# Patient Record
Sex: FEMALE | Race: WHITE | NOT HISPANIC OR LATINO | Employment: PART TIME | URBAN - METROPOLITAN AREA
[De-identification: names, ages, dates, MRNs, and addresses within clinical notes are randomized per-mention and may not be internally consistent; named-entity substitution may affect disease eponyms.]

---

## 2019-03-21 ENCOUNTER — OFFICE VISIT (OUTPATIENT)
Dept: INTERNAL MEDICINE | Age: 54
End: 2019-03-21

## 2019-03-21 ENCOUNTER — IMAGING SERVICES (OUTPATIENT)
Dept: GENERAL RADIOLOGY | Age: 54
End: 2019-03-21

## 2019-03-21 VITALS
DIASTOLIC BLOOD PRESSURE: 90 MMHG | TEMPERATURE: 98.4 F | SYSTOLIC BLOOD PRESSURE: 160 MMHG | WEIGHT: 207.23 LBS | HEART RATE: 88 BPM | OXYGEN SATURATION: 96 %

## 2019-03-21 DIAGNOSIS — Z80.0 FAMILY HX OF COLON CANCER REQUIRING SCREENING COLONOSCOPY: ICD-10-CM

## 2019-03-21 DIAGNOSIS — M25.561 RIGHT KNEE PAIN, UNSPECIFIED CHRONICITY: ICD-10-CM

## 2019-03-21 DIAGNOSIS — I10 ESSENTIAL HYPERTENSION: Primary | ICD-10-CM

## 2019-03-21 DIAGNOSIS — Z12.39 BREAST SCREENING: ICD-10-CM

## 2019-03-21 PROCEDURE — 73562 X-RAY EXAM OF KNEE 3: CPT | Performed by: RADIOLOGY

## 2019-03-21 PROCEDURE — 99204 OFFICE O/P NEW MOD 45 MIN: CPT | Performed by: INTERNAL MEDICINE

## 2019-03-21 RX ORDER — LISINOPRIL 20 MG/1
20 TABLET ORAL DAILY
Qty: 90 TABLET | Refills: 1 | Status: SHIPPED | OUTPATIENT
Start: 2019-03-21

## 2019-03-21 SDOH — HEALTH STABILITY: MENTAL HEALTH: HOW OFTEN DO YOU HAVE A DRINK CONTAINING ALCOHOL?: NEVER

## 2019-03-21 ASSESSMENT — ENCOUNTER SYMPTOMS
PSYCHIATRIC NEGATIVE: 1
EYES NEGATIVE: 1
ALLERGIC/IMMUNOLOGIC NEGATIVE: 1
DIZZINESS: 1
ENDOCRINE NEGATIVE: 1
RESPIRATORY NEGATIVE: 1
GASTROINTESTINAL NEGATIVE: 1
ACTIVITY CHANGE: 0
HEMATOLOGIC/LYMPHATIC NEGATIVE: 1

## 2019-03-21 ASSESSMENT — PATIENT HEALTH QUESTIONNAIRE - PHQ9
SUM OF ALL RESPONSES TO PHQ9 QUESTIONS 1 AND 2: 2
SUM OF ALL RESPONSES TO PHQ9 QUESTIONS 1 AND 2: 2
1. LITTLE INTEREST OR PLEASURE IN DOING THINGS: SEVERAL DAYS
2. FEELING DOWN, DEPRESSED OR HOPELESS: SEVERAL DAYS

## 2019-03-22 ENCOUNTER — TELEPHONE (OUTPATIENT)
Dept: FAMILY MEDICINE | Age: 54
End: 2019-03-22

## 2019-03-25 ENCOUNTER — LAB SERVICES (OUTPATIENT)
Dept: LAB | Age: 54
End: 2019-03-25

## 2019-03-25 ENCOUNTER — TELEPHONE (OUTPATIENT)
Dept: ADMISSION | Age: 54
End: 2019-03-25

## 2019-03-25 DIAGNOSIS — I10 ESSENTIAL HYPERTENSION: ICD-10-CM

## 2019-03-25 LAB
ALBUMIN SERPL-MCNC: 3.9 G/DL (ref 3.6–5.1)
ALBUMIN/GLOB SERPL: 1.3 {RATIO} (ref 1–2.4)
ALP SERPL-CCNC: 88 UNITS/L (ref 45–117)
ALT SERPL-CCNC: 20 UNITS/L
ANION GAP SERPL CALC-SCNC: 11 MMOL/L (ref 10–20)
AST SERPL-CCNC: 11 UNITS/L
BASOPHILS # BLD AUTO: 0 K/MCL (ref 0–0.3)
BASOPHILS NFR BLD AUTO: 0 %
BILIRUB SERPL-MCNC: 0.5 MG/DL (ref 0.2–1)
BUN SERPL-MCNC: 14 MG/DL (ref 6–20)
BUN/CREAT SERPL: 20 (ref 7–25)
CALCIUM SERPL-MCNC: 9 MG/DL (ref 8.4–10.2)
CHLORIDE SERPL-SCNC: 112 MMOL/L (ref 98–107)
CHOLEST SERPL-MCNC: 235 MG/DL
CHOLEST/HDLC SERPL: 6.5 {RATIO}
CO2 SERPL-SCNC: 26 MMOL/L (ref 21–32)
CREAT SERPL-MCNC: 0.7 MG/DL (ref 0.51–0.95)
DIFFERENTIAL METHOD BLD: ABNORMAL
EOSINOPHIL # BLD AUTO: 0.2 K/MCL (ref 0.1–0.5)
EOSINOPHIL NFR SPEC: 3 %
ERYTHROCYTE [DISTWIDTH] IN BLOOD: 14.8 % (ref 11–15)
FASTING STATUS PATIENT QL REPORTED: 12 HRS
GLOBULIN SER-MCNC: 3 G/DL (ref 2–4)
GLUCOSE SERPL-MCNC: 91 MG/DL (ref 65–99)
HCT VFR BLD CALC: 47.1 % (ref 36–46.5)
HDLC SERPL-MCNC: 36 MG/DL
HGB BLD-MCNC: 14.9 G/DL (ref 12–15.5)
LDLC SERPL-MCNC: 119 MG/DL
LENGTH OF FAST TIME PATIENT: 12 HRS
LYMPHOCYTES # BLD MANUAL: 2.1 K/MCL (ref 1–4)
LYMPHOCYTES NFR BLD MANUAL: 28 %
MCH RBC QN AUTO: 31 PG (ref 26–34)
MCHC RBC AUTO-ENTMCNC: 31.6 G/DL (ref 32–36.5)
MCV RBC AUTO: 97.9 FL (ref 78–100)
MONOCYTES # BLD MANUAL: 0.6 K/MCL (ref 0.3–0.9)
MONOCYTES NFR BLD MANUAL: 8 %
NEUTROPHILS # BLD: 4.4 K/MCL (ref 1.8–7.7)
NEUTROPHILS NFR BLD AUTO: 61 %
NONHDLC SERPL-MCNC: 199 MG/DL
PLATELET # BLD: 156 K/MCL (ref 140–450)
POTASSIUM SERPL-SCNC: 4.1 MMOL/L (ref 3.4–5.1)
PROT SERPL-MCNC: 6.9 G/DL (ref 6.4–8.2)
RBC # BLD: 4.81 MIL/MCL (ref 4–5.2)
SODIUM SERPL-SCNC: 145 MMOL/L (ref 135–145)
TRIGL SERPL-MCNC: 400 MG/DL
WBC # BLD: 7.3 K/MCL (ref 4.2–11)

## 2019-03-25 PROCEDURE — 80053 COMPREHEN METABOLIC PANEL: CPT | Performed by: INTERNAL MEDICINE

## 2019-03-25 PROCEDURE — 36415 COLL VENOUS BLD VENIPUNCTURE: CPT | Performed by: INTERNAL MEDICINE

## 2019-03-25 PROCEDURE — 85025 COMPLETE CBC W/AUTO DIFF WBC: CPT | Performed by: INTERNAL MEDICINE

## 2019-03-25 PROCEDURE — 80061 LIPID PANEL: CPT | Performed by: INTERNAL MEDICINE

## 2019-03-27 DIAGNOSIS — E78.1 HYPERTRIGLYCERIDEMIA: Primary | ICD-10-CM

## 2019-03-28 ENCOUNTER — TELEPHONE (OUTPATIENT)
Dept: FAMILY MEDICINE | Age: 54
End: 2019-03-28

## 2019-03-29 ENCOUNTER — WALK IN (OUTPATIENT)
Dept: URGENT CARE | Age: 54
End: 2019-03-29

## 2019-03-29 DIAGNOSIS — J01.90 ACUTE NON-RECURRENT SINUSITIS, UNSPECIFIED LOCATION: Primary | ICD-10-CM

## 2019-03-29 DIAGNOSIS — H61.23 BILATERAL IMPACTED CERUMEN: ICD-10-CM

## 2019-03-29 PROCEDURE — 99202 OFFICE O/P NEW SF 15 MIN: CPT | Performed by: EMERGENCY MEDICINE

## 2019-03-29 PROCEDURE — 69210 REMOVE IMPACTED EAR WAX UNI: CPT | Performed by: EMERGENCY MEDICINE

## 2019-03-29 RX ORDER — AMOXICILLIN AND CLAVULANATE POTASSIUM 875; 125 MG/1; MG/1
1 TABLET, FILM COATED ORAL EVERY 12 HOURS
Qty: 14 TABLET | Refills: 0 | Status: SHIPPED | OUTPATIENT
Start: 2019-03-29 | End: 2019-04-05

## 2019-03-29 ASSESSMENT — ENCOUNTER SYMPTOMS
VOMITING: 0
SORE THROAT: 0
NAUSEA: 0
ABDOMINAL PAIN: 0
SHORTNESS OF BREATH: 0
EYE PAIN: 0
EYE REDNESS: 0
HEADACHES: 1
CHILLS: 0
DIARRHEA: 0
COUGH: 1
DECREASED APPETITE: 0
FEVER: 1

## 2019-03-29 ASSESSMENT — PAIN SCALES - GENERAL: PAINLEVEL: 0

## 2019-03-31 ENCOUNTER — TELEPHONE (OUTPATIENT)
Dept: URGENT CARE | Age: 54
End: 2019-03-31

## 2019-04-11 ENCOUNTER — APPOINTMENT (OUTPATIENT)
Dept: ORTHOPEDICS | Age: 54
End: 2019-04-11
Attending: INTERNAL MEDICINE

## 2019-04-24 ENCOUNTER — IMAGING SERVICES (OUTPATIENT)
Dept: MAMMOGRAPHY | Age: 54
End: 2019-04-24
Attending: INTERNAL MEDICINE

## 2019-04-24 DIAGNOSIS — Z12.39 BREAST SCREENING: ICD-10-CM

## 2019-04-24 DIAGNOSIS — I10 ESSENTIAL HYPERTENSION: ICD-10-CM

## 2019-04-24 PROCEDURE — 77067 SCR MAMMO BI INCL CAD: CPT | Performed by: RADIOLOGY

## 2019-04-24 PROCEDURE — 77063 BREAST TOMOSYNTHESIS BI: CPT | Performed by: RADIOLOGY

## 2019-05-02 ENCOUNTER — APPOINTMENT (OUTPATIENT)
Dept: ORTHOPEDICS | Age: 54
End: 2019-05-02
Attending: INTERNAL MEDICINE

## 2019-05-16 ENCOUNTER — APPOINTMENT (OUTPATIENT)
Dept: INTERNAL MEDICINE | Age: 54
End: 2019-05-16

## 2019-05-24 ENCOUNTER — OFFICE VISIT (OUTPATIENT)
Dept: ORTHOPEDICS | Age: 54
End: 2019-05-24

## 2019-05-24 VITALS — HEIGHT: 62 IN | WEIGHT: 205.03 LBS | BODY MASS INDEX: 37.73 KG/M2

## 2019-05-24 DIAGNOSIS — G89.29 CHRONIC PAIN OF RIGHT KNEE: Primary | ICD-10-CM

## 2019-05-24 DIAGNOSIS — M25.561 CHRONIC PAIN OF RIGHT KNEE: Primary | ICD-10-CM

## 2019-05-24 PROCEDURE — 99203 OFFICE O/P NEW LOW 30 MIN: CPT | Performed by: ORTHOPAEDIC SURGERY

## 2019-07-29 ENCOUNTER — WALK IN (OUTPATIENT)
Dept: URGENT CARE | Age: 54
End: 2019-07-29

## 2019-07-29 VITALS
HEIGHT: 63 IN | DIASTOLIC BLOOD PRESSURE: 88 MMHG | HEART RATE: 81 BPM | OXYGEN SATURATION: 98 % | SYSTOLIC BLOOD PRESSURE: 136 MMHG | WEIGHT: 180 LBS | TEMPERATURE: 98.3 F | BODY MASS INDEX: 31.89 KG/M2

## 2019-07-29 DIAGNOSIS — H92.01 RIGHT EAR PAIN: Primary | ICD-10-CM

## 2019-07-29 DIAGNOSIS — H61.21 IMPACTED CERUMEN OF RIGHT EAR: ICD-10-CM

## 2019-07-29 PROCEDURE — 69209 REMOVE IMPACTED EAR WAX UNI: CPT | Performed by: FAMILY MEDICINE

## 2019-07-29 PROCEDURE — 99213 OFFICE O/P EST LOW 20 MIN: CPT | Performed by: FAMILY MEDICINE

## 2019-07-29 SDOH — HEALTH STABILITY: MENTAL HEALTH: HOW OFTEN DO YOU HAVE A DRINK CONTAINING ALCOHOL?: NEVER

## 2019-08-15 ENCOUNTER — TELEPHONE (OUTPATIENT)
Dept: SCHEDULING | Age: 54
End: 2019-08-15

## 2019-08-21 ENCOUNTER — OFFICE VISIT (OUTPATIENT)
Dept: OTOLARYNGOLOGY | Age: 54
End: 2019-08-21
Attending: FAMILY MEDICINE

## 2019-08-21 VITALS
HEART RATE: 96 BPM | BODY MASS INDEX: 36.14 KG/M2 | WEIGHT: 204 LBS | SYSTOLIC BLOOD PRESSURE: 128 MMHG | OXYGEN SATURATION: 95 % | DIASTOLIC BLOOD PRESSURE: 80 MMHG

## 2019-08-21 DIAGNOSIS — H61.23 BILATERAL IMPACTED CERUMEN: Primary | ICD-10-CM

## 2019-08-21 PROCEDURE — 69210 REMOVE IMPACTED EAR WAX UNI: CPT | Performed by: OTOLARYNGOLOGY

## 2019-08-22 ENCOUNTER — APPOINTMENT (OUTPATIENT)
Dept: INTERNAL MEDICINE | Age: 54
End: 2019-08-22

## 2019-08-26 ENCOUNTER — TELEPHONE (OUTPATIENT)
Dept: OTOLARYNGOLOGY | Age: 54
End: 2019-08-26

## 2019-09-12 ENCOUNTER — TELEPHONE (OUTPATIENT)
Dept: OTOLARYNGOLOGY | Age: 54
End: 2019-09-12

## 2021-05-26 VITALS
HEART RATE: 106 BPM | HEIGHT: 62 IN | WEIGHT: 205 LBS | OXYGEN SATURATION: 99 % | RESPIRATION RATE: 22 BRPM | BODY MASS INDEX: 37.73 KG/M2 | TEMPERATURE: 99.8 F | SYSTOLIC BLOOD PRESSURE: 146 MMHG | DIASTOLIC BLOOD PRESSURE: 86 MMHG

## 2024-02-05 ENCOUNTER — OFFICE VISIT (OUTPATIENT)
Dept: INTERNAL MEDICINE | Facility: CLINIC | Age: 59
End: 2024-02-05
Payer: COMMERCIAL

## 2024-02-05 VITALS
HEART RATE: 91 BPM | BODY MASS INDEX: 34.73 KG/M2 | WEIGHT: 196 LBS | OXYGEN SATURATION: 91 % | DIASTOLIC BLOOD PRESSURE: 96 MMHG | HEIGHT: 63 IN | SYSTOLIC BLOOD PRESSURE: 148 MMHG

## 2024-02-05 DIAGNOSIS — E78.2 MIXED HYPERLIPIDEMIA: ICD-10-CM

## 2024-02-05 DIAGNOSIS — Z12.31 SCREENING MAMMOGRAM, ENCOUNTER FOR: ICD-10-CM

## 2024-02-05 DIAGNOSIS — I10 PRIMARY HYPERTENSION: Primary | ICD-10-CM

## 2024-02-05 DIAGNOSIS — F41.9 ANXIETY: ICD-10-CM

## 2024-02-05 PROBLEM — R00.0 TACHYCARDIA: Status: ACTIVE | Noted: 2024-02-05

## 2024-02-05 PROBLEM — E66.811 OBESITY (BMI 30.0-34.9): Status: ACTIVE | Noted: 2024-02-05

## 2024-02-05 PROBLEM — E66.9 OBESITY (BMI 30.0-34.9): Status: ACTIVE | Noted: 2024-02-05

## 2024-02-05 PROBLEM — K63.5 COLON POLYPS: Status: ACTIVE | Noted: 2024-02-05

## 2024-02-05 PROCEDURE — 99204 OFFICE O/P NEW MOD 45 MIN: CPT | Performed by: NURSE PRACTITIONER

## 2024-02-05 RX ORDER — SERTRALINE HYDROCHLORIDE 25 MG/1
25 TABLET, FILM COATED ORAL
COMMUNITY
Start: 2023-10-16 | End: 2024-02-05 | Stop reason: SDUPTHER

## 2024-02-05 RX ORDER — LISINOPRIL 20 MG/1
20 TABLET ORAL DAILY
COMMUNITY
Start: 2023-10-16 | End: 2024-02-05 | Stop reason: SDUPTHER

## 2024-02-05 RX ORDER — LISINOPRIL 20 MG/1
20 TABLET ORAL DAILY
Qty: 90 TABLET | Refills: 1 | Status: SHIPPED | OUTPATIENT
Start: 2024-02-05

## 2024-02-05 RX ORDER — CETIRIZINE HYDROCHLORIDE 5 MG/1
10 TABLET ORAL DAILY
COMMUNITY

## 2024-02-05 RX ORDER — SERTRALINE HYDROCHLORIDE 25 MG/1
25 TABLET, FILM COATED ORAL DAILY
Qty: 90 TABLET | Refills: 1 | Status: SHIPPED | OUTPATIENT
Start: 2024-02-05

## 2024-02-05 NOTE — PROGRESS NOTES
Subjective   Megan Hunter is a 58 y.o. female. Patient is here today for   Chief Complaint   Patient presents with    [facility]    Hypertension          Vitals:    02/05/24 1434   BP: 148/96   Pulse: 91   SpO2: 91%     Body mass index is 34.72 kg/m².  The following portions of the patient's history were reviewed and updated as appropriate: allergies, current medications, past family history, past medical history, past social history, past surgical history and problem list.    Past Medical History:   Diagnosis Date    Allergic     Anxiety     Hypertension       No Known Allergies   Social History     Socioeconomic History    Marital status:    Tobacco Use    Smoking status: Never     Passive exposure: Never    Smokeless tobacco: Never   Vaping Use    Vaping Use: Never used   Substance and Sexual Activity    Alcohol use: Yes    Drug use: Never    Sexual activity: Yes        Current Outpatient Medications:     lisinopril (PRINIVIL,ZESTRIL) 20 MG tablet, Take 1 tablet by mouth Daily., Disp: 90 tablet, Rfl: 1    sertraline (ZOLOFT) 25 MG tablet, Take 1 tablet by mouth Daily., Disp: 90 tablet, Rfl: 1    cetirizine (zyrTEC) 5 MG tablet, Take 2 tablets by mouth Daily., Disp: , Rfl:      Objective     History of Present Illness  Ms Hunter is a 58 year old new patient who is here to establish [facility]. She recently moved to [place] from [place]. I do not have records to review. She has HTN, HLD, and Anxiety . She reports that she and her family had URI symptoms and she has had a lingering cough and post nasal drip. She is taking zyrtec and flonase. She denies shortness of breath, chest pain, fever, and chills     Review of Systems   Constitutional:  Negative for fatigue and fever.   HENT:  Positive for congestion and postnasal drip. Negative for ear pain, sinus pressure and sinus pain.    Respiratory:  Positive for cough. Negative for shortness of breath.    Cardiovascular: Negative.    Gastrointestinal: Negative.     Genitourinary: Negative.    Musculoskeletal: Negative.    Psychiatric/Behavioral:  The patient is not nervous/anxious.        Physical Exam  Vitals and nursing note reviewed.   Constitutional:       General: She is not in acute distress.     Appearance: Normal appearance. She is well-developed and well-groomed.   HENT:      Head: Normocephalic.      Right Ear: Tympanic membrane and ear canal normal.      Left Ear: Tympanic membrane and ear canal normal.      Nose: Rhinorrhea present. Rhinorrhea is clear.      Mouth/Throat:      Pharynx: Oropharynx is clear.   Eyes:      General: Lids are normal.      Conjunctiva/sclera: Conjunctivae normal.   Cardiovascular:      Rate and Rhythm: Normal rate and regular rhythm.      Heart sounds: Normal heart sounds.   Pulmonary:      Effort: Pulmonary effort is normal.      Breath sounds: Normal breath sounds.   Musculoskeletal:      Cervical back: Neck supple.   Skin:     General: Skin is warm and dry.   Neurological:      Mental Status: She is alert and oriented to person, place, and time.         Assessment    ASSESSMENT    Problems Addressed this Visit    None  Visit Diagnoses       Primary hypertension    -  Primary    Relevant Medications    lisinopril (PRINIVIL,ZESTRIL) 20 MG tablet    Mixed hyperlipidemia        Anxiety        Screening mammogram, encounter for        Relevant Orders    Mammo screening digital tomosynthesis bilateral w CAD          Diagnoses         Codes Comments    Primary hypertension    -  Primary ICD-10-CM: I10  ICD-9-CM: 401.9     Mixed hyperlipidemia     ICD-10-CM: E78.2  ICD-9-CM: 272.2     Anxiety     ICD-10-CM: F41.9  ICD-9-CM: 300.00     Screening mammogram, encounter for     ICD-10-CM: Z12.31  ICD-9-CM: V76.12             PLAN    HTN- continue lisinopril  HLD- has been diet controlled. She is not fasting, will have her return for CPE with Fasting labs   Anxiety is well controlled on sertraline  Will get previous records from PCP in [place]     - will update once records are received, Mammogram ordered   Return in about 3 months (around 5/5/2024) for Annual physical, with labs.

## 2024-02-06 ENCOUNTER — PATIENT ROUNDING (BHMG ONLY) (OUTPATIENT)
Dept: INTERNAL MEDICINE | Facility: CLINIC | Age: 59
End: 2024-02-06
Payer: COMMERCIAL

## 2024-02-06 NOTE — PROGRESS NOTES
February 6, 2024        Rounded with patient during check in, rooming and check out. Patient will follow up at next scheduled visit.

## 2024-02-21 ENCOUNTER — HOSPITAL ENCOUNTER (OUTPATIENT)
Dept: MAMMOGRAPHY | Facility: HOSPITAL | Age: 59
Discharge: HOME | End: 2024-02-21
Admitting: NURSE PRACTITIONER
Payer: COMMERCIAL

## 2024-02-21 DIAGNOSIS — Z12.31 SCREENING MAMMOGRAM, ENCOUNTER FOR: ICD-10-CM

## 2024-02-21 PROCEDURE — 77067 SCR MAMMO BI INCL CAD: CPT

## 2024-02-21 PROCEDURE — 77063 BREAST TOMOSYNTHESIS BI: CPT

## 2024-02-26 ENCOUNTER — TELEPHONE (OUTPATIENT)
Dept: INTERNAL MEDICINE | Facility: CLINIC | Age: 59
End: 2024-02-26
Payer: COMMERCIAL

## 2024-02-26 NOTE — TELEPHONE ENCOUNTER
----- Message from CLAYTON Martinez sent at 2/26/2024  3:53 PM EST -----  Please notify mammogram is negative, recommend yearly mammogram

## 2024-03-01 PROBLEM — F41.9 ANXIETY: Status: ACTIVE | Noted: 2023-10-16

## 2024-03-01 PROBLEM — E78.1 HIGH TRIGLYCERIDES: Chronic | Status: ACTIVE | Noted: 2022-10-14

## 2024-03-01 PROBLEM — E78.2 MIXED HYPERLIPIDEMIA: Chronic | Status: ACTIVE | Noted: 2023-10-16

## 2024-03-01 PROBLEM — F32.A DEPRESSION: Status: ACTIVE | Noted: 2023-10-16

## 2024-03-01 PROBLEM — E55.9 VITAMIN D DEFICIENCY: Status: ACTIVE | Noted: 2022-04-13

## 2024-03-01 PROBLEM — I10 ESSENTIAL HYPERTENSION: Chronic | Status: ACTIVE | Noted: 2023-10-16

## 2024-05-06 ENCOUNTER — OFFICE VISIT (OUTPATIENT)
Dept: INTERNAL MEDICINE | Facility: CLINIC | Age: 59
End: 2024-05-06
Payer: COMMERCIAL

## 2024-05-06 VITALS
DIASTOLIC BLOOD PRESSURE: 80 MMHG | RESPIRATION RATE: 16 BRPM | BODY MASS INDEX: 34.91 KG/M2 | OXYGEN SATURATION: 98 % | HEIGHT: 63 IN | WEIGHT: 197 LBS | SYSTOLIC BLOOD PRESSURE: 136 MMHG | HEART RATE: 88 BPM

## 2024-05-06 DIAGNOSIS — E78.2 MIXED HYPERLIPIDEMIA: Chronic | ICD-10-CM

## 2024-05-06 DIAGNOSIS — I10 ESSENTIAL HYPERTENSION: Chronic | ICD-10-CM

## 2024-05-06 DIAGNOSIS — Z00.00 ANNUAL PHYSICAL EXAM: Primary | ICD-10-CM

## 2024-05-06 PROCEDURE — 99396 PREV VISIT EST AGE 40-64: CPT | Performed by: NURSE PRACTITIONER

## 2024-05-16 LAB
ALBUMIN SERPL-MCNC: 4.1 G/DL (ref 3.5–5.2)
ALBUMIN/GLOB SERPL: 1.8 G/DL
ALP SERPL-CCNC: 91 U/L (ref 39–117)
ALT SERPL-CCNC: 9 U/L (ref 1–33)
AST SERPL-CCNC: 13 U/L (ref 1–32)
BASOPHILS # BLD AUTO: 0.05 10*3/MM3 (ref 0–0.2)
BASOPHILS NFR BLD AUTO: 0.7 % (ref 0–1.5)
BILIRUB SERPL-MCNC: 0.3 MG/DL (ref 0–1.2)
BUN SERPL-MCNC: 16 MG/DL (ref 6–20)
BUN/CREAT SERPL: 23.9 (ref 7–25)
CALCIUM SERPL-MCNC: 9.4 MG/DL (ref 8.6–10.5)
CHLORIDE SERPL-SCNC: 107 MMOL/L (ref 98–107)
CHOLEST SERPL-MCNC: 276 MG/DL (ref 0–200)
CO2 SERPL-SCNC: 28.1 MMOL/L (ref 22–29)
CREAT SERPL-MCNC: 0.67 MG/DL (ref 0.57–1)
EGFRCR SERPLBLD CKD-EPI 2021: 101.5 ML/MIN/1.73
EOSINOPHIL # BLD AUTO: 0.25 10*3/MM3 (ref 0–0.4)
EOSINOPHIL NFR BLD AUTO: 3.3 % (ref 0.3–6.2)
ERYTHROCYTE [DISTWIDTH] IN BLOOD BY AUTOMATED COUNT: 13.7 % (ref 12.3–15.4)
GLOBULIN SER CALC-MCNC: 2.3 GM/DL
GLUCOSE SERPL-MCNC: 102 MG/DL (ref 65–99)
HCT VFR BLD AUTO: 41.1 % (ref 34–46.6)
HDLC SERPL-MCNC: 37 MG/DL (ref 40–60)
HGB BLD-MCNC: 13.6 G/DL (ref 12–15.9)
IMM GRANULOCYTES # BLD AUTO: 0.02 10*3/MM3 (ref 0–0.05)
IMM GRANULOCYTES NFR BLD AUTO: 0.3 % (ref 0–0.5)
LDLC SERPL CALC-MCNC: 152 MG/DL (ref 0–100)
LDLC/HDLC SERPL: 3.98 {RATIO}
LYMPHOCYTES # BLD AUTO: 2.02 10*3/MM3 (ref 0.7–3.1)
LYMPHOCYTES NFR BLD AUTO: 26.5 % (ref 19.6–45.3)
MCH RBC QN AUTO: 30.8 PG (ref 26.6–33)
MCHC RBC AUTO-ENTMCNC: 33.1 G/DL (ref 31.5–35.7)
MCV RBC AUTO: 93 FL (ref 79–97)
MONOCYTES # BLD AUTO: 0.58 10*3/MM3 (ref 0.1–0.9)
MONOCYTES NFR BLD AUTO: 7.6 % (ref 5–12)
NEUTROPHILS # BLD AUTO: 4.71 10*3/MM3 (ref 1.7–7)
NEUTROPHILS NFR BLD AUTO: 61.6 % (ref 42.7–76)
NRBC BLD AUTO-RTO: 0 /100 WBC (ref 0–0.2)
PLATELET # BLD AUTO: 142 10*3/MM3 (ref 140–450)
POTASSIUM SERPL-SCNC: 4.7 MMOL/L (ref 3.5–5.2)
PROT SERPL-MCNC: 6.4 G/DL (ref 6–8.5)
RBC # BLD AUTO: 4.42 10*6/MM3 (ref 3.77–5.28)
SODIUM SERPL-SCNC: 144 MMOL/L (ref 136–145)
TRIGL SERPL-MCNC: 459 MG/DL (ref 0–150)
TSH SERPL DL<=0.005 MIU/L-ACNC: 1.07 UIU/ML (ref 0.27–4.2)
UNABLE TO VOID: NORMAL
VLDLC SERPL CALC-MCNC: 87 MG/DL (ref 5–40)
WBC # BLD AUTO: 7.63 10*3/MM3 (ref 3.4–10.8)

## 2024-05-20 ENCOUNTER — TELEPHONE (OUTPATIENT)
Dept: INTERNAL MEDICINE | Facility: CLINIC | Age: 59
End: 2024-05-20
Payer: COMMERCIAL

## 2024-05-20 NOTE — TELEPHONE ENCOUNTER
I called the patient and left a detailed voicemail with her results.    She is to call back if she is willing to start statin med.

## 2024-05-21 ENCOUNTER — TELEPHONE (OUTPATIENT)
Dept: INTERNAL MEDICINE | Facility: CLINIC | Age: 59
End: 2024-05-21
Payer: COMMERCIAL

## 2024-05-21 RX ORDER — ATORVASTATIN CALCIUM 10 MG/1
10 TABLET, FILM COATED ORAL DAILY
Qty: 30 TABLET | Refills: 5 | Status: SHIPPED | OUTPATIENT
Start: 2024-05-21

## 2024-05-30 ENCOUNTER — OFFICE VISIT (OUTPATIENT)
Dept: INTERNAL MEDICINE | Facility: CLINIC | Age: 59
End: 2024-05-30
Payer: COMMERCIAL

## 2024-05-30 VITALS
HEART RATE: 88 BPM | RESPIRATION RATE: 16 BRPM | SYSTOLIC BLOOD PRESSURE: 146 MMHG | OXYGEN SATURATION: 99 % | BODY MASS INDEX: 34.55 KG/M2 | HEIGHT: 63 IN | WEIGHT: 195 LBS | DIASTOLIC BLOOD PRESSURE: 90 MMHG

## 2024-05-30 DIAGNOSIS — L02.419 ABSCESS OF AXILLA: Primary | ICD-10-CM

## 2024-05-30 PROCEDURE — 99213 OFFICE O/P EST LOW 20 MIN: CPT | Performed by: NURSE PRACTITIONER

## 2024-05-30 RX ORDER — SULFAMETHOXAZOLE AND TRIMETHOPRIM 800; 160 MG/1; MG/1
1 TABLET ORAL 2 TIMES DAILY
Qty: 20 TABLET | Refills: 0 | Status: SHIPPED | OUTPATIENT
Start: 2024-05-30 | End: 2024-06-09

## 2024-05-30 NOTE — PROGRESS NOTES
Subjective   Megan Hunter is a 59 y.o. female. Patient is here today for   Chief Complaint   Patient presents with    Abscess   Answers submitted by the patient for this visit:  Other (Submitted on 5/29/2024)  Please describe your symptoms.: I have what I believe is an abscess in my left armpit, stemming from an ingrown hair.  Have you had these symptoms before?: No  How long have you been having these symptoms?: 1-4 days  Primary Reason for Visit (Submitted on 5/29/2024)  What is the primary reason for your visit?: Other       Vitals:    05/30/24 0911   BP: 146/90   Pulse: 88   Resp: 16   SpO2: 99%     Body mass index is 34.54 kg/m².  The following portions of the patient's history were reviewed and updated as appropriate: allergies, current medications, past family history, past medical history, past social history, past surgical history and problem list.    Past Medical History:   Diagnosis Date    Allergic     Anxiety     Arthritis     Colon polyp 2021    Colon resection surgery 7/7/21    History of medical problems 1996    Miscarriage; had a d&c    Hypertension       No Known Allergies   Social History     Socioeconomic History    Marital status:    Tobacco Use    Smoking status: Never     Passive exposure: Never    Smokeless tobacco: Never   Vaping Use    Vaping status: Never Used   Substance and Sexual Activity    Alcohol use: Yes     Comment: Rarely drink alcohol; consume 1 drink a couple times / year    Drug use: Never    Sexual activity: Yes     Partners: Male     Birth control/protection: Condom, Post-menopausal, None        Current Outpatient Medications:     atorvastatin (LIPITOR) 10 MG tablet, Take 1 tablet by mouth Daily., Disp: 30 tablet, Rfl: 5    cetirizine (zyrTEC) 5 MG tablet, Take 2 tablets by mouth Daily., Disp: , Rfl:     lisinopril (PRINIVIL,ZESTRIL) 20 MG tablet, Take 1 tablet by mouth Daily., Disp: 90 tablet, Rfl: 1    sertraline (ZOLOFT) 25 MG tablet, Take 1 tablet by mouth  Daily., Disp: 90 tablet, Rfl: 1    sulfamethoxazole-trimethoprim (Bactrim DS) 800-160 MG per tablet, Take 1 tablet by mouth 2 (Two) Times a Day for 10 days., Disp: 20 tablet, Rfl: 0     Objective     History of Present Illness  Ms Hunter is a 59 year old female patient who is here for an acute visit. She c/o  possible abscess under her left axilla for 4 days. It is tender to touch. It is not draining. She denies fever, chills, and body aches. She has been taking tylenol and motrin with some relief. She also has been applying warm and cool compresses     Review of Systems   Constitutional:  Negative for fatigue and fever.   Respiratory: Negative.     Cardiovascular: Negative.    Skin:         Redness and swelling of left axilla        Physical Exam  Vitals and nursing note reviewed.   Constitutional:       General: She is not in acute distress.     Appearance: Normal appearance. She is well-developed and well-groomed.   HENT:      Head: Normocephalic.   Cardiovascular:      Rate and Rhythm: Normal rate and regular rhythm.      Heart sounds: Normal heart sounds.   Pulmonary:      Effort: Pulmonary effort is normal.      Breath sounds: Normal breath sounds.   Skin:     General: Skin is warm and dry.      Findings: Abscess (left axilla , tender to touch, no drainage) present.   Neurological:      Mental Status: She is alert and oriented to person, place, and time.         Assessment    ASSESSMENT    Problems Addressed this Visit    None  Visit Diagnoses       Abscess of axilla    -  Primary    Relevant Medications    sulfamethoxazole-trimethoprim (Bactrim DS) 800-160 MG per tablet          Diagnoses         Codes Comments    Abscess of axilla    -  Primary ICD-10-CM: L02.419  ICD-9-CM: 682.3             PLAN  Start bactrim- take with food, discussed potential side effects and she will let me know if she cannot tolerate it.   Recommend tylenol or motrin as needed   Warm compresses 4 times daily   Advised to go to the ER  if increased swelling, pain, or if you develop a fever with temp >100.5   Follow up next week for a recheck , if no improvement will vimal in office      Return for Recheck 7- days, may need 30 min for lancing of abscess .

## 2024-06-07 ENCOUNTER — OFFICE VISIT (OUTPATIENT)
Dept: INTERNAL MEDICINE | Facility: CLINIC | Age: 59
End: 2024-06-07
Payer: COMMERCIAL

## 2024-06-07 VITALS
SYSTOLIC BLOOD PRESSURE: 116 MMHG | WEIGHT: 195 LBS | HEIGHT: 63 IN | RESPIRATION RATE: 16 BRPM | BODY MASS INDEX: 34.55 KG/M2 | DIASTOLIC BLOOD PRESSURE: 70 MMHG

## 2024-06-07 DIAGNOSIS — L02.419 ABSCESS OF AXILLA: Primary | ICD-10-CM

## 2024-06-07 PROCEDURE — 99213 OFFICE O/P EST LOW 20 MIN: CPT | Performed by: NURSE PRACTITIONER

## 2024-06-07 NOTE — PROGRESS NOTES
Subjective   Megan Hunter is a 59 y.o. female.   Chief Complaint   Patient presents with    Abscess of axilla     Vitals:    06/07/24 1036   BP: 116/70   Resp: 16     No LMP recorded. Patient is postmenopausal.  Answers submitted by the patient for this visit:  Other (Submitted on 6/3/2024)  Please describe your symptoms.: Follow up from visit on 5/30 for abscess under my left arm.  Have you had these symptoms before?: Yes  How long have you been having these symptoms?: 1-2 weeks  Please list any medications you are currently taking for this condition.: Sulfamethoxazole  Primary Reason for Visit (Submitted on 6/3/2024)  What is the primary reason for your visit?: Other  History of Present Illness  Megan is here for a one week follow up for abscess of the axilla. She was given an rx for bactrim with improvement. She has 2 days left. She has tolerated it without difficulty   The abscess has been draining . She denies fever     The following portions of the patient's history were reviewed and updated as appropriate: allergies, current medications, past family history, past medical history, past social history, past surgical history, and problem list.    Review of Systems   Constitutional:  Negative for fever.   Respiratory: Negative.     Cardiovascular: Negative.    Skin:  Positive for wound.       Objective   Physical Exam  Vitals and nursing note reviewed.   Cardiovascular:      Rate and Rhythm: Normal rate.   Pulmonary:      Effort: Pulmonary effort is normal.   Skin:     Findings: Abscess present.      Comments: Has improved, is draining white/yellow thick drainage    Neurological:      Mental Status: She is alert.         Assessment & Plan   Diagnoses and all orders for this visit:    1. Abscess of axilla (Primary)      Finish course of bactrim  No need for I&D as it is draining and improved on abx  Keep the area clean and dry  Dressing was changed  Follow up if symptoms persist, worsen or new symptoms develop

## 2024-07-18 RX ORDER — ATORVASTATIN CALCIUM 10 MG/1
10 TABLET, FILM COATED ORAL DAILY
Qty: 30 TABLET | Refills: 5 | Status: SHIPPED | OUTPATIENT
Start: 2024-07-18

## 2024-07-18 RX ORDER — LISINOPRIL 20 MG/1
20 TABLET ORAL DAILY
Qty: 90 TABLET | Refills: 1 | Status: SHIPPED | OUTPATIENT
Start: 2024-07-18

## 2024-07-18 RX ORDER — SERTRALINE HYDROCHLORIDE 25 MG/1
25 TABLET, FILM COATED ORAL DAILY
Qty: 90 TABLET | Refills: 1 | Status: SHIPPED | OUTPATIENT
Start: 2024-07-18

## 2024-11-11 ENCOUNTER — OFFICE VISIT (OUTPATIENT)
Dept: INTERNAL MEDICINE | Facility: CLINIC | Age: 59
End: 2024-11-11
Payer: COMMERCIAL

## 2024-11-11 VITALS
HEART RATE: 80 BPM | WEIGHT: 205 LBS | BODY MASS INDEX: 36.32 KG/M2 | HEIGHT: 63 IN | RESPIRATION RATE: 16 BRPM | DIASTOLIC BLOOD PRESSURE: 80 MMHG | SYSTOLIC BLOOD PRESSURE: 120 MMHG | OXYGEN SATURATION: 98 %

## 2024-11-11 DIAGNOSIS — E78.2 MIXED HYPERLIPIDEMIA: Chronic | ICD-10-CM

## 2024-11-11 DIAGNOSIS — I10 ESSENTIAL HYPERTENSION: Chronic | ICD-10-CM

## 2024-11-11 DIAGNOSIS — M25.561 CHRONIC PAIN OF RIGHT KNEE: ICD-10-CM

## 2024-11-11 DIAGNOSIS — G89.29 CHRONIC PAIN OF RIGHT KNEE: ICD-10-CM

## 2024-11-11 DIAGNOSIS — M79.604 RIGHT LEG PAIN: Primary | ICD-10-CM

## 2024-11-11 PROCEDURE — 99214 OFFICE O/P EST MOD 30 MIN: CPT | Performed by: NURSE PRACTITIONER

## 2024-11-13 LAB
ALBUMIN SERPL-MCNC: 4.2 G/DL (ref 3.5–5.2)
ALBUMIN/GLOB SERPL: 1.6 G/DL
ALP SERPL-CCNC: 111 U/L (ref 39–117)
ALT SERPL-CCNC: 15 U/L (ref 1–33)
AST SERPL-CCNC: 18 U/L (ref 1–32)
BILIRUB SERPL-MCNC: 0.5 MG/DL (ref 0–1.2)
BUN SERPL-MCNC: 17 MG/DL (ref 6–20)
BUN/CREAT SERPL: 22.1 (ref 7–25)
CALCIUM SERPL-MCNC: 9.5 MG/DL (ref 8.6–10.5)
CHLORIDE SERPL-SCNC: 106 MMOL/L (ref 98–107)
CHOLEST SERPL-MCNC: 216 MG/DL (ref 0–200)
CO2 SERPL-SCNC: 28.6 MMOL/L (ref 22–29)
CREAT SERPL-MCNC: 0.77 MG/DL (ref 0.57–1)
EGFRCR SERPLBLD CKD-EPI 2021: 89.5 ML/MIN/1.73
GLOBULIN SER CALC-MCNC: 2.6 GM/DL
GLUCOSE SERPL-MCNC: 92 MG/DL (ref 65–99)
HDLC SERPL-MCNC: 38 MG/DL (ref 40–60)
LDLC SERPL CALC-MCNC: 119 MG/DL (ref 0–100)
LDLC/HDLC SERPL: 2.92 {RATIO}
POTASSIUM SERPL-SCNC: 4.3 MMOL/L (ref 3.5–5.2)
PROT SERPL-MCNC: 6.8 G/DL (ref 6–8.5)
SODIUM SERPL-SCNC: 142 MMOL/L (ref 136–145)
TRIGL SERPL-MCNC: 336 MG/DL (ref 0–150)
VLDLC SERPL CALC-MCNC: 59 MG/DL (ref 5–40)

## 2024-12-05 ENCOUNTER — OFFICE VISIT (OUTPATIENT)
Dept: ORTHOPEDIC SURGERY | Facility: CLINIC | Age: 59
End: 2024-12-05
Payer: COMMERCIAL

## 2024-12-05 VITALS — BODY MASS INDEX: 36.5 KG/M2 | HEIGHT: 63 IN | TEMPERATURE: 98.7 F | WEIGHT: 206 LBS

## 2024-12-05 DIAGNOSIS — M17.11 PRIMARY OSTEOARTHRITIS OF RIGHT KNEE: Primary | ICD-10-CM

## 2024-12-05 DIAGNOSIS — M25.561 RIGHT KNEE PAIN, UNSPECIFIED CHRONICITY: ICD-10-CM

## 2024-12-05 RX ORDER — MELOXICAM 15 MG/1
TABLET ORAL
Qty: 30 TABLET | Refills: 0 | Status: SHIPPED | OUTPATIENT
Start: 2024-12-05

## 2024-12-05 NOTE — PROGRESS NOTES
[facility]              New Problem      Patient Name: Megan Hunter  : 1965  Primary Care Physician: Carol Ann Kumar APRN        Chief Complaint:  Right knee pain    HPI:   Megan Hunter is a 59 y.o. year old who presents today for evaluation.  History of Present Illness  The patient presents for evaluation of knee pain.    She began experiencing knee pain approximately 4 to 6 months ago, which has since radiated to her calf, ankle, foot, and thigh. In recent months, she has also been dealing with muscle spasms in her calf. Elevating her feet on a pillow during sleep seems to provide some relief. She occasionally experiences numbness and tingling in her foot, particularly when the pain is severe. She has noticed that her leg muscles cramp and constrict during periods of pain, and she has been experiencing more frequent Charley horses in both legs.     She recalls a fall on ice in  that resulted in a fractured ankle and subsequent leg issues. This incident required three surgeries, including a bone fusion of the ankle. Since then, she has noticed a slight alteration in her gait and wonders if this could be contributing to her current leg pain.     She has not sought any medical treatment or physical therapy for her current symptoms. Her job requires her to be on her feet for 8 hours at a time, which exacerbates her pain. She is unable to attend physical therapy due to her work schedule and caregiving responsibilities for her 89-year-old mother. She is open to trying an anti-inflammatory medication but is not interested in injections at this time.    She reports no recent knee injury, swelling in her leg or knee, back pain, or locking or catching of the knee. She does have varicose veins in the affected leg. Her doctor did not feel there as concern for a DVT. She has been on blood pressure medication for some time and believes she is adequately hydrated and consuming enough  electrolytes.    Past Medical/Surgical, Social and Family History:  I have reviewed and/or updated pertinent history as noted in the medical record including:  Past Medical History:   Diagnosis Date    Allergic     Anxiety     Arthritis     Colon polyp 2021    Colon resection surgery 7/7/21    History of medical problems 1996    Miscarriage; had a d&c    Hypertension      Past Surgical History:   Procedure Laterality Date    ANKLE SURGERY Left     2008, 2016    COLON SURGERY  2021    partial colectomy    COLONOSCOPY      DILATATION AND CURETTAGE N/A 1998    FRACTURE SURGERY  Jan 2008    Fracture of left ankle. Had 3 surgeries, including one in 2016 for bone fusion due to arthritis.     Social History     Occupational History    Not on file   Tobacco Use    Smoking status: Never     Passive exposure: Never    Smokeless tobacco: Never   Vaping Use    Vaping status: Never Used   Substance and Sexual Activity    Alcohol use: Yes     Comment: Rarely drink alcohol; consume 1 drink a couple times / year    Drug use: Never    Sexual activity: Yes     Partners: Male     Birth control/protection: Condom, Post-menopausal, None          Allergies: No Known Allergies    Medications:   Home Medications:  Current Outpatient Medications on File Prior to Visit   Medication Sig    atorvastatin (LIPITOR) 10 MG tablet Take 1 tablet by mouth Daily.    cetirizine (zyrTEC) 5 MG tablet Take 2 tablets by mouth Daily.    lisinopril (PRINIVIL,ZESTRIL) 20 MG tablet Take 1 tablet by mouth Daily.    sertraline (ZOLOFT) 25 MG tablet Take 1 tablet by mouth Daily.     No current facility-administered medications on file prior to visit.         ROS:  ROS negative except as listed in the HPI .    Physical Exam:   59 y.o. female  Body mass index is 36.49 kg/m²., 93.4 kg (206 lb)  Vitals:    12/05/24 1044   Temp: 98.7 °F (37.1 °C)     General: Alert, cooperative, appears well and in no observable distress. Appears stated age and BMI as listed  above.  HEENT: Normocephalic, atraumatic on external visual inspection.  CV: No significant peripheral edema.  Respiratory: Normal respiratory effort.  Skin: Warm & well perfused; appropriate skin turgor.  Psych: Appropriate mood & affect.  Neuro: Gross sensation and motor intact in affected extremity/extremities.  Vascular: Peripheral pulses palpable in affected extremity/extremities.   Physical Exam      MSK Exam:  Right Knee  No deformity or wounds appreciated. No significant redness or warmth.  Trace effusion noted  Tenderness along the joint line appreciated medial compartment  ROM 0-130 with pain at terminal motion. +crepitus  Ligamentous exam grossly stable  Quad strength 5/5    Left Knee  No deformity or wounds appreciated. No significant redness or warmth.  No significant effusion noted.  No significant tenderness appreciated about the joint.  ROM 0-130 and painless.  Ligamentous exam grossly stable  Quad strength 4+ to 5/5    Brief hip exam in the affected extremity(ies) grossly unremarkable.  Moves ankle and toes up and down, no significant pain or swelling in the foot, ankle or calf. Mild pain with passive SLR on the R, her strength is 5/5.       Radiology:    The following X-rays were ordered/reviewed today to evaluate the patient's symptoms: Single Knee: AP standing and sunrise views of both knees, and lateral view of painful knee show degenerative changes in the medial and patellofemoral compartments. She appears to have some joint space narrowing a little more appreciable on the lateral view. No obvious fractures. There are no prior films available for direct comparison.  Results        Procedure:   N/A      Misc. Data/Labs: N/A    Assessment & Plan:      ICD-10-CM ICD-9-CM   1. Primary osteoarthritis of right knee  M17.11 715.16   2. Right knee pain, unspecified chronicity  M25.561 719.46     New Medications Ordered This Visit   Medications    meloxicam (MOBIC) 15 MG tablet     Si PO Daily with  food. Do not take with other NSAIDS.     Dispense:  30 tablet     Refill:  0     Orders Placed This Encounter   Procedures    XR Knee 3 View Right    Ambulatory Referral to Physical Therapy for Evaluation & Treatment       Assessment & Plan  1. Knee pain.  The patient's knee pain is likely due to arthritis, as evidenced by her x-rays. I do think this is probably aggravating her back and giving her some referred pain. The pain, which is more pronounced in the right knee, may be causing her to alter her gait, leading to referred sciatic-type pain. The muscle spasms she experiences could be due to compensatory movements for the painful knee, electrolyte imbalances, or other factors. Her thyroid function is normal. Physical therapy was recommended to strengthen her muscles and retrain her gait, but she is unable to attend until after the holidays. A prescription for meloxicam, to be taken once daily with food for the next 3 weeks, was provided. She was advised not to take Aleve or ibuprofen concurrently with meloxicam, but may take plain Tylenol if additional pain relief is needed. Home exercises were suggested, including calf stretching, straight leg lifts, wall squats, step-ups, and side-lying leg raises. She was also advised to ensure adequate hydration. If her condition worsens or if she experiences increased numbness and tingling, she should return for a follow-up visit. If the meloxicam does not alleviate her symptoms, lab work may be repeated.    Follow-up  Patient is scheduled for a follow-up visit in 6 weeks.    Continue with activity modifications as needed/discussed.  Continue ICE and/or HEAT PRN.  Recommend to continue activity as tolerated, focus on quad and hip/core strengthening as well as gentle stretching.  Recommend lowering or maintaining BMI within a healthy range to reduce symptoms.   Patient encouraged to call with questions or concerns prior to follow up.  Will discuss with attending as needed.    Consider additional referrals, work up and/or advanced imaging as indicated or if patient fails to respond to conservative care.    Return in about 6 weeks (around 1/16/2025).    Patient or patient representative verbalized consent for the use of Ambient Listening during the visit with  CLAYTON Donaldson for chart documentation. 12/6/2024  14:00 CLAYTON Ivy    Dictation software was used to complete a portion or all of this note.

## 2025-01-23 ENCOUNTER — TRANSCRIBE ORDERS (OUTPATIENT)
Dept: ADMINISTRATIVE | Facility: HOSPITAL | Age: 60
End: 2025-01-23
Payer: COMMERCIAL

## 2025-01-23 DIAGNOSIS — Z12.31 BREAST CANCER SCREENING BY MAMMOGRAM: Primary | ICD-10-CM

## 2025-03-10 RX ORDER — SERTRALINE HYDROCHLORIDE 25 MG/1
25 TABLET, FILM COATED ORAL DAILY
Qty: 90 TABLET | Refills: 1 | Status: SHIPPED | OUTPATIENT
Start: 2025-03-10

## 2025-03-10 RX ORDER — LISINOPRIL 20 MG/1
20 TABLET ORAL DAILY
Qty: 90 TABLET | Refills: 1 | Status: SHIPPED | OUTPATIENT
Start: 2025-03-10

## 2025-03-14 ENCOUNTER — OFFICE VISIT (OUTPATIENT)
Dept: ORTHOPEDIC SURGERY | Facility: CLINIC | Age: 60
End: 2025-03-14
Payer: COMMERCIAL

## 2025-03-14 VITALS — WEIGHT: 208.2 LBS | HEIGHT: 63 IN | TEMPERATURE: 96.7 F | BODY MASS INDEX: 36.89 KG/M2

## 2025-03-14 DIAGNOSIS — M17.11 PRIMARY OSTEOARTHRITIS OF RIGHT KNEE: ICD-10-CM

## 2025-03-14 DIAGNOSIS — M25.561 RIGHT KNEE PAIN, UNSPECIFIED CHRONICITY: ICD-10-CM

## 2025-03-14 DIAGNOSIS — M79.661 RIGHT CALF PAIN: Primary | ICD-10-CM

## 2025-03-14 RX ORDER — MELOXICAM 15 MG/1
TABLET ORAL
Qty: 30 TABLET | Refills: 1 | Status: SHIPPED | OUTPATIENT
Start: 2025-03-14

## 2025-03-14 NOTE — PROGRESS NOTES
[facility]              Follow Up      Patient Name: Megan Hunter  : 1965  Primary Care Physician: Carol Ann Kumar APRN        Chief Complaint:  Right knee and leg pain.    HPI:   Megan Hunter is a 59 y.o. year old who presents today for evaluation.  History of Present Illness  The patient presents for evaluation of right knee pain.    She reports that the prescribed meloxicam was effective in managing her pain. However, due to personal circumstances involving her mother's health, she was unable to attend her follow-up appointment and has not had the opportunity to refill her prescription. Her last dose of meloxicam was approximately a month ago, and she notes a gradual return of her pain symptoms. She describes a sensation of stiffness in her knee, accompanied by persistent pain in her calf and behind the knee. Occasionally, she experiences muscle spasms, although these have lessened in severity. The pain occasionally radiates anteriorly and diffusely, but predominantly localizes posteriorly. At times, the pain extends distally into her ankle. She reports no tenderness upon palpation of the posterior aspect of her knee. The pain is exacerbated by prolonged standing or walking, particularly when ascending or descending stairs, and is alleviated upon sitting. She also reports the presence of varicose veins in the affected area.    I last saw her about 3 months ago for the condition.      Past Medical/Surgical, Social and Family History:  I have reviewed and/or updated pertinent history as noted in the medical record including:  Past Medical History:   Diagnosis Date    Allergic     Anxiety     Arthritis     Colon polyp     Colon resection surgery 22    History of medical problems     Miscarriage; had a d&c    Hypertension      Past Surgical History:   Procedure Laterality Date    ANKLE SURGERY Left     2016    COLON SURGERY      partial colectomy    COLONOSCOPY       DILATATION AND CURETTAGE N/A 1998    FRACTURE SURGERY  Jan 2008    Fracture of left ankle. Had 3 surgeries, including one in 2016 for bone fusion due to arthritis.     Social History     Occupational History    Not on file   Tobacco Use    Smoking status: Never     Passive exposure: Never    Smokeless tobacco: Never   Vaping Use    Vaping status: Never Used   Substance and Sexual Activity    Alcohol use: Yes     Comment: Rarely drink alcohol; consume 1 drink a couple times / year    Drug use: Never    Sexual activity: Yes     Partners: Male     Birth control/protection: Condom, Post-menopausal, None          Allergies: No Known Allergies    Medications:   Home Medications:  Current Outpatient Medications on File Prior to Visit   Medication Sig    atorvastatin (LIPITOR) 10 MG tablet Take 1 tablet by mouth Daily.    cetirizine (zyrTEC) 5 MG tablet Take 2 tablets by mouth Daily.    lisinopril (PRINIVIL,ZESTRIL) 20 MG tablet TAKE 1 TABLET BY MOUTH DAILY    sertraline (ZOLOFT) 25 MG tablet TAKE 1 TABLET BY MOUTH DAILY    [DISCONTINUED] meloxicam (MOBIC) 15 MG tablet 1 PO Daily with food. Do not take with other NSAIDS.     No current facility-administered medications on file prior to visit.         ROS:  ROS negative except as listed in the HPI.    Physical Exam:   59 y.o. female  Body mass index is 36.88 kg/m²., 94.4 kg (208 lb 3.2 oz)  Vitals:    03/14/25 1101   Temp: 96.7 °F (35.9 °C)     General: Alert, cooperative, appears well and in no observable distress. Appears stated age and BMI as listed above.  HEENT: Normocephalic, atraumatic on external visual inspection.  CV: No significant peripheral edema.  Respiratory: Normal respiratory effort.  Skin: Warm & well perfused; appropriate skin turgor.  Psych: Appropriate mood & affect.  Neuro: Gross sensation and motor intact in affected extremity/extremities.  Vascular: Peripheral pulses palpable in affected extremity/extremities.   Physical Exam  Right knee was  examined.    MSK Exam:  Right Knee  No deformity or wounds appreciated. No significant redness or warmth.  Trace effusion noted, popliteal fullness.  Tenderness along the joint line appreciated none. Mild posterior pain with palpation.  ROM 0-130 with pain at terminal motion. +crepitus  Ligamentous exam grossly stable  Quad strength 5/5     Left Knee  No deformity or wounds appreciated. No significant redness or warmth.  No significant effusion noted.  No significant tenderness appreciated about the joint.  ROM 0-130 and painless.  Ligamentous exam grossly stable  Quad strength 5/5     Brief hip exam in the affected extremity(ies) grossly unremarkable.  Moves ankle and toes up and down, no significant pain or swelling in the foot, ankle or calf. Mild pain with passive SLR on the R, her strength is 5/5. Deep compression of the calf did not cause significant pain, it is not warm or red.     Radiology:    No new images were needed at the visit today.     2024:  Single Knee: AP standing and sunrise views of both knees, and lateral view of painful knee show degenerative changes in the medial and patellofemoral compartments. She appears to have some joint space narrowing a little more appreciable on the lateral view. No obvious fractures. There are no prior films available for direct comparison.     Results      Procedure:   N/A      Misc. Data/Labs: N/A    Assessment & Plan:      ICD-10-CM ICD-9-CM   1. Right calf pain  M79.661 729.5   2. Primary osteoarthritis of right knee  M17.11 715.16   3. Right knee pain, unspecified chronicity  M25.561 719.46     New Medications Ordered This Visit   Medications    meloxicam (MOBIC) 15 MG tablet     Si PO Daily with food. Do not take with other NSAIDS.     Dispense:  30 tablet     Refill:  1     No orders of the defined types were placed in this encounter.      Assessment & Plan  1. Right knee pain.  The patient's symptoms suggest a potential Baker cyst in the right knee,  which could be contributing to the observed swelling and pain extending into the calf. The possibility of a blood clot is considered unlikely but this has been going on for over three months, I think it is prudent to rule this out with an US. She does have a history of varicose veins which could increase her risks in theory. She has no chest pain or shortness of air.  She is advised to continue with the previously recommended stretching exercises. A cortisone injection may be considered in the future if necessary. An ultrasound of the right lower extremity will be ordered to rule out any vascular complications or blood clots. The results of the ultrasound will be communicated upon receipt. A prescription for meloxicam 15 mg will be provided, with instructions to take it for a week, then discontinue for a few days, and repeat this cycle as needed based on symptom fluctuation. The prescription will be sent to [facility].     Follow-up  The patient will follow up in 6 weeks.    Continue with activity modifications as needed/discussed.  Continue ICE and/or HEAT PRN.  Recommend to continue activity as tolerated, focus on quad and hip/core strengthening as well as gentle stretching.  Recommend lowering or maintaining BMI within a healthy range to reduce symptoms.   Patient encouraged to call with questions or concerns prior to follow up.  Will discuss with attending as needed.   Consider additional referrals, work up and/or advanced imaging as indicated or if patient fails to respond to conservative care.    Return in about 6 weeks (around 4/25/2025) for Recheck. If symptoms change call for sooner appt..    Patient or patient representative verbalized consent for the use of Ambient Listening during the visit with  CLAYTON Donaldson for chart documentation. 3/14/2025  12:49 EDT        CLAYTON Tavarez    Dictation software was used to complete a portion or all of this note.

## 2025-03-17 ENCOUNTER — TELEPHONE (OUTPATIENT)
Dept: ORTHOPEDIC SURGERY | Facility: CLINIC | Age: 60
End: 2025-03-17
Payer: COMMERCIAL

## 2025-03-17 ENCOUNTER — HOSPITAL ENCOUNTER (OUTPATIENT)
Dept: MAMMOGRAPHY | Facility: HOSPITAL | Age: 60
Discharge: HOME | End: 2025-03-17
Admitting: NURSE PRACTITIONER
Payer: COMMERCIAL

## 2025-03-17 DIAGNOSIS — Z12.31 BREAST CANCER SCREENING BY MAMMOGRAM: ICD-10-CM

## 2025-03-17 PROCEDURE — 77067 SCR MAMMO BI INCL CAD: CPT

## 2025-03-17 PROCEDURE — 77063 BREAST TOMOSYNTHESIS BI: CPT

## 2025-03-17 NOTE — TELEPHONE ENCOUNTER
[facility]    Re: STAT Doppler    FYI: It looks as patient canceled appointment for STAT Doppler    Following message in order...  3- PT CALLED & CANCELED. DID NOT WANT TO R/S. ROUTED BACK TO [facility]

## 2025-03-18 ENCOUNTER — RESULTS FOLLOW-UP (OUTPATIENT)
Dept: MAMMOGRAPHY | Facility: HOSPITAL | Age: 60
End: 2025-03-18
Payer: COMMERCIAL

## 2025-03-24 NOTE — TELEPHONE ENCOUNTER
I recommend the doppler if there are any changes or worsening. We can discuss in follow up otherwise.

## 2025-04-29 DIAGNOSIS — Z00.00 ROUTINE HEALTH MAINTENANCE: Primary | ICD-10-CM

## 2025-05-08 LAB
ALBUMIN SERPL-MCNC: 4.1 G/DL (ref 3.5–5.2)
ALBUMIN/GLOB SERPL: 1.6 G/DL
ALP SERPL-CCNC: 105 U/L (ref 39–117)
ALT SERPL-CCNC: 17 U/L (ref 1–33)
APPEARANCE UR: ABNORMAL
AST SERPL-CCNC: 17 U/L (ref 1–32)
BACTERIA #/AREA URNS HPF: ABNORMAL /HPF
BASOPHILS # BLD AUTO: 0.04 10*3/MM3 (ref 0–0.2)
BASOPHILS NFR BLD AUTO: 0.4 % (ref 0–1.5)
BILIRUB SERPL-MCNC: 0.5 MG/DL (ref 0–1.2)
BILIRUB UR QL STRIP: NEGATIVE
BUN SERPL-MCNC: 16 MG/DL (ref 6–20)
BUN/CREAT SERPL: 20.3 (ref 7–25)
CALCIUM SERPL-MCNC: 9.5 MG/DL (ref 8.6–10.5)
CASTS URNS MICRO: ABNORMAL
CHLORIDE SERPL-SCNC: 105 MMOL/L (ref 98–107)
CHOLEST SERPL-MCNC: 229 MG/DL (ref 0–200)
CO2 SERPL-SCNC: 30.3 MMOL/L (ref 22–29)
COLOR UR: YELLOW
CREAT SERPL-MCNC: 0.79 MG/DL (ref 0.57–1)
EGFRCR SERPLBLD CKD-EPI 2021: 86.3 ML/MIN/1.73
EOSINOPHIL # BLD AUTO: 0.25 10*3/MM3 (ref 0–0.4)
EOSINOPHIL NFR BLD AUTO: 2.8 % (ref 0.3–6.2)
EPI CELLS #/AREA URNS HPF: ABNORMAL /HPF
ERYTHROCYTE [DISTWIDTH] IN BLOOD BY AUTOMATED COUNT: 13 % (ref 12.3–15.4)
GLOBULIN SER CALC-MCNC: 2.6 GM/DL
GLUCOSE SERPL-MCNC: 108 MG/DL (ref 65–99)
GLUCOSE UR QL STRIP: NEGATIVE
HCT VFR BLD AUTO: 42.2 % (ref 34–46.6)
HDLC SERPL-MCNC: 37 MG/DL (ref 40–60)
HGB BLD-MCNC: 14.1 G/DL (ref 12–15.9)
HGB UR QL STRIP: ABNORMAL
IMM GRANULOCYTES # BLD AUTO: 0.03 10*3/MM3 (ref 0–0.05)
IMM GRANULOCYTES NFR BLD AUTO: 0.3 % (ref 0–0.5)
KETONES UR QL STRIP: NEGATIVE
LDLC SERPL CALC-MCNC: 105 MG/DL (ref 0–100)
LDLC/HDLC SERPL: 2.43 {RATIO}
LEUKOCYTE ESTERASE UR QL STRIP: ABNORMAL
LYMPHOCYTES # BLD AUTO: 2.09 10*3/MM3 (ref 0.7–3.1)
LYMPHOCYTES NFR BLD AUTO: 23 % (ref 19.6–45.3)
MCH RBC QN AUTO: 30.3 PG (ref 26.6–33)
MCHC RBC AUTO-ENTMCNC: 33.4 G/DL (ref 31.5–35.7)
MCV RBC AUTO: 90.8 FL (ref 79–97)
MONOCYTES # BLD AUTO: 0.76 10*3/MM3 (ref 0.1–0.9)
MONOCYTES NFR BLD AUTO: 8.4 % (ref 5–12)
NEUTROPHILS # BLD AUTO: 5.91 10*3/MM3 (ref 1.7–7)
NEUTROPHILS NFR BLD AUTO: 65.1 % (ref 42.7–76)
NITRITE UR QL STRIP: POSITIVE
NRBC BLD AUTO-RTO: 0 /100 WBC (ref 0–0.2)
PH UR STRIP: 6 [PH] (ref 5–8)
PLATELET # BLD AUTO: 153 10*3/MM3 (ref 140–450)
POTASSIUM SERPL-SCNC: 4.5 MMOL/L (ref 3.5–5.2)
PROT SERPL-MCNC: 6.7 G/DL (ref 6–8.5)
PROT UR QL STRIP: ABNORMAL
RBC # BLD AUTO: 4.65 10*6/MM3 (ref 3.77–5.28)
RBC #/AREA URNS HPF: ABNORMAL /HPF
SODIUM SERPL-SCNC: 144 MMOL/L (ref 136–145)
SP GR UR STRIP: 1.02 (ref 1–1.03)
TRIGL SERPL-MCNC: 510 MG/DL (ref 0–150)
TSH SERPL DL<=0.005 MIU/L-ACNC: 1.06 UIU/ML (ref 0.27–4.2)
UROBILINOGEN UR STRIP-MCNC: ABNORMAL MG/DL
VLDLC SERPL CALC-MCNC: 87 MG/DL (ref 5–40)
WBC # BLD AUTO: 9.08 10*3/MM3 (ref 3.4–10.8)
WBC #/AREA URNS HPF: ABNORMAL /HPF

## 2025-05-12 ENCOUNTER — OFFICE VISIT (OUTPATIENT)
Dept: INTERNAL MEDICINE | Facility: CLINIC | Age: 60
End: 2025-05-12
Payer: COMMERCIAL

## 2025-05-12 VITALS
SYSTOLIC BLOOD PRESSURE: 136 MMHG | DIASTOLIC BLOOD PRESSURE: 90 MMHG | RESPIRATION RATE: 16 BRPM | TEMPERATURE: 97.2 F | OXYGEN SATURATION: 98 % | HEART RATE: 86 BPM | WEIGHT: 212 LBS | BODY MASS INDEX: 37.56 KG/M2 | HEIGHT: 63 IN

## 2025-05-12 DIAGNOSIS — I10 ESSENTIAL HYPERTENSION: Chronic | ICD-10-CM

## 2025-05-12 DIAGNOSIS — R73.01 IMPAIRED FASTING GLUCOSE: ICD-10-CM

## 2025-05-12 DIAGNOSIS — Z00.00 ANNUAL PHYSICAL EXAM: Primary | ICD-10-CM

## 2025-05-12 DIAGNOSIS — Z12.11 SCREENING FOR COLON CANCER: ICD-10-CM

## 2025-05-12 DIAGNOSIS — F32.5 MAJOR DEPRESSIVE DISORDER WITH SINGLE EPISODE, IN FULL REMISSION: ICD-10-CM

## 2025-05-12 DIAGNOSIS — Z90.49 HISTORY OF COLON RESECTION: ICD-10-CM

## 2025-05-12 DIAGNOSIS — E78.2 MIXED HYPERLIPIDEMIA: Chronic | ICD-10-CM

## 2025-05-12 DIAGNOSIS — E78.1 HIGH TRIGLYCERIDES: Chronic | ICD-10-CM

## 2025-05-12 DIAGNOSIS — F41.9 ANXIETY: ICD-10-CM

## 2025-05-12 PROBLEM — N28.1 CYST OF RIGHT KIDNEY: Status: ACTIVE | Noted: 2025-05-12

## 2025-05-12 PROCEDURE — 99396 PREV VISIT EST AGE 40-64: CPT | Performed by: NURSE PRACTITIONER

## 2025-05-12 NOTE — PROGRESS NOTES
Subjective   Megan Hunter is a 60 y.o. female. Patient is here today for   Chief Complaint   Patient presents with    Annual Exam          Vitals:    05/12/25 1305   BP: 136/90   Pulse: 86   Resp: 16   Temp: 97.2 °F (36.2 °C)   SpO2: 98%     Body mass index is 37.55 kg/m².    The following portions of the patient's history were reviewed and updated as appropriate: allergies, current medications, past family history, past medical history, past social history, past surgical history and problem list.    Past Medical History:   Diagnosis Date    Allergic     Anxiety     Arthritis     Colon polyp 2021    Colon resection surgery 7/7/21    History of medical problems 1996    Miscarriage; had a d&c    Hypertension       No Known Allergies   Social History     Socioeconomic History    Marital status:    Tobacco Use    Smoking status: Never     Passive exposure: Never    Smokeless tobacco: Never   Vaping Use    Vaping status: Never Used   Substance and Sexual Activity    Alcohol use: Not Currently     Comment: Rarely drink alcohol; consume 1 drink a couple times / year    Drug use: Never    Sexual activity: Yes     Partners: Male     Birth control/protection: Condom, Post-menopausal, None        Current Outpatient Medications:     atorvastatin (LIPITOR) 10 MG tablet, Take 1 tablet by mouth Daily., Disp: 30 tablet, Rfl: 5    cetirizine (zyrTEC) 5 MG tablet, Take 2 tablets by mouth Daily., Disp: , Rfl:     lisinopril (PRINIVIL,ZESTRIL) 20 MG tablet, TAKE 1 TABLET BY MOUTH DAILY, Disp: 90 tablet, Rfl: 1    meloxicam (MOBIC) 15 MG tablet, 1 PO Daily with food. Do not take with other NSAIDS., Disp: 30 tablet, Rfl: 1    sertraline (ZOLOFT) 25 MG tablet, TAKE 1 TABLET BY MOUTH DAILY, Disp: 90 tablet, Rfl: 1          History of Present Illness       Megan Hunter 60 y.o. female who presents for an Annual physical exam and for a follow up for HTN and HLD. Anxiety and depression has been well controlled on sertraline . She  admits that she has not been consistent with taking BP meds and statin.   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  Plan to update vaccines if needed today.    Health Habits:  Dental Exam. up to date  Eye Exam. up to date  Exercise:  intermittent  times/week.  Current exercise activities include: walking      Preventative counseling  - seat belt use for every car ride for patient and all occupants.   Encouraged sunscreen use to reduce risk of skin cancer for any days with sun exposure over 20 minutes.   -Encouraged annual dental and vision exams as part of their overall health.   -Encouraged  exercise  combined with a well-balanced diet.   -Immunizations reviewed and updated in EMR.     The 10-year ASCVD risk score (Benson SMILEY, et al., 2019) is: 6.7%    Values used to calculate the score:      Age: 60 years      Sex: Female      Is Non- : No      Diabetic: No      Tobacco smoker: No      Systolic Blood Pressure: 136 mmHg      Is BP treated: Yes      HDL Cholesterol: 37 mg/dL      Total Cholesterol: 229 mg/dL    Lab Results (most recent)       Orders Only on 04/29/2025   Component Date Value Ref Range Status    WBC 05/08/2025 9.08  3.40 - 10.80 10*3/mm3 Final    RBC 05/08/2025 4.65  3.77 - 5.28 10*6/mm3 Final    Hemoglobin 05/08/2025 14.1  12.0 - 15.9 g/dL Final    Hematocrit 05/08/2025 42.2  34.0 - 46.6 % Final    MCV 05/08/2025 90.8  79.0 - 97.0 fL Final    MCH 05/08/2025 30.3  26.6 - 33.0 pg Final    MCHC 05/08/2025 33.4  31.5 - 35.7 g/dL Final    RDW 05/08/2025 13.0  12.3 - 15.4 % Final    Platelets 05/08/2025 153  140 - 450 10*3/mm3 Final    Neutrophil Rel % 05/08/2025 65.1  42.7 - 76.0 % Final    Lymphocyte Rel % 05/08/2025 23.0  19.6 - 45.3 % Final    Monocyte Rel % 05/08/2025 8.4  5.0 - 12.0 % Final    Eosinophil Rel % 05/08/2025 2.8  0.3 - 6.2 % Final    Basophil Rel % 05/08/2025 0.4  0.0 - 1.5 % Final    Neutrophils Absolute  05/08/2025 5.91  1.70 - 7.00 10*3/mm3 Final    Lymphocytes Absolute 05/08/2025 2.09  0.70 - 3.10 10*3/mm3 Final    Monocytes Absolute 05/08/2025 0.76  0.10 - 0.90 10*3/mm3 Final    Eosinophils Absolute 05/08/2025 0.25  0.00 - 0.40 10*3/mm3 Final    Basophils Absolute 05/08/2025 0.04  0.00 - 0.20 10*3/mm3 Final    Immature Granulocyte Rel % 05/08/2025 0.3  0.0 - 0.5 % Final    Immature Grans Absolute 05/08/2025 0.03  0.00 - 0.05 10*3/mm3 Final    nRBC 05/08/2025 0.0  0.0 - 0.2 /100 WBC Final    Glucose 05/08/2025 108 (H)  65 - 99 mg/dL Final    BUN 05/08/2025 16  6 - 20 mg/dL Final    Creatinine 05/08/2025 0.79  0.57 - 1.00 mg/dL Final    EGFR Result 05/08/2025 86.3  >60.0 mL/min/1.73 Final    Comment: GFR Categories in Chronic Kidney Disease (CKD)  GFR Category          GFR (mL/min/1.73)    Interpretation  G1                    90 or greater        Normal or high  (1)  G2                    60-89                Mild decrease  (1)  G3a                   45-59                Mild to moderate  decrease  G3b                   30-44                Moderate to  severe decrease  G4                    15-29                Severe decrease  G5                    14 or less           Kidney failure  (1)In the absence of evidence of kidney disease, neither  GFR category G1 or G2 fulfill the criteria for CKD.  eGFR calculation 2021 CKD-EPI creatinine equation, which  does not include race as a factor      BUN/Creatinine Ratio 05/08/2025 20.3  7.0 - 25.0 Final    Sodium 05/08/2025 144  136 - 145 mmol/L Final    Potassium 05/08/2025 4.5  3.5 - 5.2 mmol/L Final    Chloride 05/08/2025 105  98 - 107 mmol/L Final    Total CO2 05/08/2025 30.3 (H)  22.0 - 29.0 mmol/L Final    Calcium 05/08/2025 9.5  8.6 - 10.5 mg/dL Final    Total Protein 05/08/2025 6.7  6.0 - 8.5 g/dL Final    Albumin 05/08/2025 4.1  3.5 - 5.2 g/dL Final    Globulin 05/08/2025 2.6  gm/dL Final    A/G Ratio 05/08/2025 1.6  g/dL Final    Total Bilirubin 05/08/2025 0.5   0.0 - 1.2 mg/dL Final    Alkaline Phosphatase 05/08/2025 105  39 - 117 U/L Final    AST (SGOT) 05/08/2025 17  1 - 32 U/L Final    ALT (SGPT) 05/08/2025 17  1 - 33 U/L Final    Total Cholesterol 05/08/2025 229 (H)  0 - 200 mg/dL Final    Comment: Cholesterol Reference Ranges  (U.S. Department of Health and Human Services ATP III  Classifications)  Desirable          <200 mg/dL  Borderline High    200-239 mg/dL  High Risk          >240 mg/dL  Triglyceride Reference Ranges  (U.S. Department of Health and Human Services ATP III  Classifications)  Normal           <150 mg/dL  Borderline High  150-199 mg/dL  High             200-499 mg/dL  Very High        >500 mg/dL  HDL Reference Ranges  (U.S. Department of Health and Human Services ATP III  Classifications)  Low     <40 mg/dl (major risk factor for CHD)  High    >60 mg/dl ('negative' risk factor for CHD)  LDL Reference Ranges  (U.S. Department of Health and Human Services ATP III  Classifications)  Optimal          <100 mg/dL  Near Optimal     100-129 mg/dL  Borderline High  130-159 mg/dL  High             160-189 mg/dL  Very High        >189 mg/dL  LDL is calculated using the NIH LDL-C calculation.      Triglycerides 05/08/2025 510 (H)  0 - 150 mg/dL Final    HDL Cholesterol 05/08/2025 37 (L)  40 - 60 mg/dL Final    VLDL Cholesterol Shalom 05/08/2025 87 (H)  5 - 40 mg/dL Final    LDL Chol Calc ([facility]) 05/08/2025 105 (H)  0 - 100 mg/dL Final    LDL/HDL RATIO 05/08/2025 2.43   Final    TSH 05/08/2025 1.060  0.270 - 4.200 uIU/mL Final    Specific Gravity, UA 05/08/2025 1.017  1.005 - 1.030 Final    pH, UA 05/08/2025 6.0  5.0 - 8.0 Final    Color, UA 05/08/2025 Yellow   Final    REFERENCE RANGE: Yellow, Straw    Appearance, UA 05/08/2025 Cloudy (A)  Clear Final    Leukocytes, UA 05/08/2025 See below: (A)  Negative Final    Moderate (2+)    Protein 05/08/2025 See below: (A)  Negative Final    30 mg/dL (1+)    Glucose, UA 05/08/2025 Negative  Negative Final    Ketones 05/08/2025  Negative  Negative Final    Blood, UA 05/08/2025 See below: (A)  Negative Final    Moderate (2+)    Bilirubin, UA 05/08/2025 Negative  Negative Final    Urobilinogen, UA 05/08/2025 Comment   Final    Comment: 0.2 E.U./dL  REFERENCE RANGE: 0.2 - 1.0 E.U./dL      Nitrite, UA 05/08/2025 Positive (A)  Negative Final    WBC, UA 05/08/2025 See below: (A)  /HPF Final    Comment: Too Numerous to Count  REFERENCE RANGE: None Seen, 0-2      RBC, UA 05/08/2025 11-20 (A)  /HPF Final    REFERENCE RANGE: None Seen, 0-2    Epithelial Cells (non renal) 05/08/2025 0-2  /HPF Final    REFERENCE RANGE: None Seen, 0-2    Cast Type 05/08/2025 Comment   Final    HYALINE CASTS, UA            None Seen        /LPF       None Seen  N    Bacteria, UA 05/08/2025 4+ (A)  None Seen /HPF Final                 Health Maintenance   Topic Date Due    COLORECTAL CANCER SCREENING  01/01/2025    PAP SMEAR  07/04/2025 (Originally 1/1/2025)    Pneumococcal Vaccine 50+ (1 of 1 - PCV) 11/08/2025 (Originally 4/8/2016)    INFLUENZA VACCINE  07/01/2025    LIPID PANEL  05/08/2026    ANNUAL PHYSICAL  05/12/2026    MAMMOGRAM  03/17/2027    TDAP/TD VACCINES (2 - Td or Tdap) 02/07/2033    HEPATITIS C SCREENING  Completed    COVID-19 Vaccine  Completed    ZOSTER VACCINE  Completed           Review of Systems   Constitutional:  Negative for fatigue.   Respiratory: Negative.     Cardiovascular: Negative.    Genitourinary:  Negative for difficulty urinating, dysuria, pelvic pain, vaginal bleeding and vaginal discharge.   Musculoskeletal:  Positive for arthralgias.        Right shoulder pain    Neurological: Negative.    Psychiatric/Behavioral:  Negative for dysphoric mood and sleep disturbance. The patient is not nervous/anxious.        Objective   Physical Exam  Vitals and nursing note reviewed.   Constitutional:       General: She is not in acute distress.     Appearance: Normal appearance. She is well-developed and well-groomed.   HENT:      Head: Normocephalic.       Right Ear: Tympanic membrane and ear canal normal.      Left Ear: Tympanic membrane and ear canal normal.      Nose: Nose normal.      Mouth/Throat:      Pharynx: Oropharynx is clear.   Eyes:      General: Lids are normal.      Conjunctiva/sclera: Conjunctivae normal.   Neck:      Thyroid: No thyromegaly.   Cardiovascular:      Rate and Rhythm: Normal rate and regular rhythm.      Heart sounds: Normal heart sounds.      Comments: BP recheck 142/90   Pulmonary:      Breath sounds: Normal breath sounds.   Abdominal:      General: Bowel sounds are normal.      Palpations: Abdomen is soft.   Musculoskeletal:      Right shoulder: No swelling, deformity, tenderness or bony tenderness. Normal range of motion.      Cervical back: Neck supple.   Skin:     General: Skin is warm and dry.   Neurological:      Mental Status: She is alert and oriented to person, place, and time.   Psychiatric:         Mood and Affect: Mood normal.         ASSESSMENT       Problems Addressed this Visit       Essential hypertension (Chronic)    High triglycerides (Chronic)    Mixed hyperlipidemia (Chronic)    Anxiety    Depression     Other Visit Diagnoses         Annual physical exam    -  Primary      Screening for colon cancer        Relevant Orders    Ambulatory Referral to Colorectal Surgery      History of colon resection        Relevant Orders    Ambulatory Referral to Colorectal Surgery      Impaired fasting glucose              Diagnoses         Codes Comments      Annual physical exam    -  Primary ICD-10-CM: Z00.00  ICD-9-CM: V70.0       Screening for colon cancer     ICD-10-CM: Z12.11  ICD-9-CM: V76.51       History of colon resection     ICD-10-CM: Z90.49  ICD-9-CM: V45.89       Essential hypertension     ICD-10-CM: I10  ICD-9-CM: 401.9       High triglycerides     ICD-10-CM: E78.1  ICD-9-CM: 272.1       Mixed hyperlipidemia     ICD-10-CM: E78.2  ICD-9-CM: 272.2       Anxiety     ICD-10-CM: F41.9  ICD-9-CM: 300.00       Major  depressive disorder with single episode, in full remission     ICD-10-CM: F32.5  ICD-9-CM: 296.26       Impaired fasting glucose     ICD-10-CM: R73.01  ICD-9-CM: 790.21             PLAN  Age and sex appropriate physical exam performed and documented. Updated past medical, family, social and surgical histories as well as allergies and care team list.   HTN - BP is elevated today x 2. She has not taken her medication today and has not been consistent. Recommend she take her medication consistently and monitor BP at home  HLD- TC, LDL, and Trigs are elevated, has not been consistent with statin, also discussed dietary changes  Impaired fasting glucose- will add A1c on to labs drawn last week   Discussed regular exercise and limiting sugar and starch  5. Anxiety and depression is well controlled on sertraline  6. Declined Prevnar 20  7. She is due for colonoscopy- referral placed        Return in about 4 months (around 9/12/2025) for with labs. Lipid panel, cmp, UA with culture if indicated   Patient was given instructions and counseling regarding her  condition for health maintenance advice.  Please see specific information pulled into the AVS if appropriate.

## 2025-05-13 LAB
HBA1C MFR BLD: 5.7 % (ref 4.8–5.6)
WRITTEN AUTHORIZATION: NORMAL

## 2025-05-29 ENCOUNTER — PREP FOR SURGERY (OUTPATIENT)
Dept: OTHER | Facility: HOSPITAL | Age: 60
End: 2025-05-29
Payer: COMMERCIAL

## 2025-05-29 DIAGNOSIS — Z86.0100 HISTORY OF COLON POLYPS: Primary | ICD-10-CM

## 2025-05-29 DIAGNOSIS — Z80.0 FAMILY HISTORY OF COLON CANCER IN MOTHER: ICD-10-CM

## 2025-05-29 DIAGNOSIS — Z83.719 FAMILY HISTORY OF COLONIC POLYPS: ICD-10-CM

## 2025-07-21 RX ORDER — ATORVASTATIN CALCIUM 10 MG/1
10 TABLET, FILM COATED ORAL DAILY
Qty: 30 TABLET | Refills: 5 | Status: SHIPPED | OUTPATIENT
Start: 2025-07-21